# Patient Record
Sex: FEMALE | URBAN - METROPOLITAN AREA
[De-identification: names, ages, dates, MRNs, and addresses within clinical notes are randomized per-mention and may not be internally consistent; named-entity substitution may affect disease eponyms.]

---

## 2023-01-20 ENCOUNTER — OFFICE VISIT (OUTPATIENT)
Dept: URBAN - METROPOLITAN AREA CLINIC 26 | Facility: CLINIC | Age: 58
End: 2023-01-20
Payer: COMMERCIAL

## 2023-01-20 DIAGNOSIS — H52.4 PRESBYOPIA: ICD-10-CM

## 2023-01-20 DIAGNOSIS — H25.13 AGE-RELATED NUCLEAR CATARACT, BILATERAL: ICD-10-CM

## 2023-01-20 DIAGNOSIS — D33.2 BENIGN TUMOR OF BRAIN: ICD-10-CM

## 2023-01-20 DIAGNOSIS — H16.223 KERATOCONJUNCTIVITIS SICCA, BILATERAL: Primary | ICD-10-CM

## 2023-01-20 PROCEDURE — 92134 CPTRZ OPH DX IMG PST SGM RTA: CPT | Performed by: OPTOMETRIST

## 2023-01-20 PROCEDURE — 92004 COMPRE OPH EXAM NEW PT 1/>: CPT | Performed by: OPTOMETRIST

## 2023-01-20 ASSESSMENT — VISUAL ACUITY
OD: 20/25
OS: 20/20

## 2023-01-20 ASSESSMENT — KERATOMETRY
OD: 43.75
OS: 44.00

## 2023-01-20 ASSESSMENT — INTRAOCULAR PRESSURE
OD: 14
OS: 14

## 2023-01-20 NOTE — IMPRESSION/PLAN
Impression: Keratoconjunctivitis sicca, bilateral: Z29.271. Plan: Recommend artificial tears at least 4 times a day and gel drop or tear ointment at bedtime, Omega 3 fatty acids (2-3,000 mg) daily. Drink plenty water. Avoid overhead fans at bedtime.

## 2023-01-20 NOTE — IMPRESSION/PLAN
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 pt reports recent onset of symptoms of headaches and transient blur. pt reports symptoms experienced consistent with prior/initial symptoms experienced when initial dx with adenoma. recommend pt work with her PCP/Neurology 2/2 headaches/transient blur.